# Patient Record
Sex: FEMALE | URBAN - METROPOLITAN AREA
[De-identification: names, ages, dates, MRNs, and addresses within clinical notes are randomized per-mention and may not be internally consistent; named-entity substitution may affect disease eponyms.]

---

## 2022-08-11 ENCOUNTER — APPOINTMENT (RX ONLY)
Dept: URBAN - METROPOLITAN AREA CLINIC 158 | Facility: CLINIC | Age: 57
Setting detail: DERMATOLOGY
End: 2022-08-11

## 2022-08-11 DIAGNOSIS — L65.0 TELOGEN EFFLUVIUM: ICD-10-CM

## 2022-08-11 PROCEDURE — ? COUNSELING

## 2022-08-11 PROCEDURE — 99202 OFFICE O/P NEW SF 15 MIN: CPT

## 2022-08-11 PROCEDURE — ? EDUCATIONAL RESOURCES PROVIDED

## 2022-08-11 PROCEDURE — ? PATIENT SPECIFIC COUNSELING

## 2022-08-11 ASSESSMENT — LOCATION DETAILED DESCRIPTION DERM: LOCATION DETAILED: LEFT SUPERIOR PARIETAL SCALP

## 2022-08-11 ASSESSMENT — LOCATION ZONE DERM: LOCATION ZONE: SCALP

## 2022-08-11 ASSESSMENT — LOCATION SIMPLE DESCRIPTION DERM: LOCATION SIMPLE: SCALP

## 2022-08-11 NOTE — PROCEDURE: PATIENT SPECIFIC COUNSELING
The patient had severe COVID infection with pneumonia but did not require hospitalization in Jan 2022. She began losing hair in April 2022. She notices that the hair loss seems to be slowing down over the past couple of weeks.  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  The condition is self-correcting and her hair will likely start to grow back over the next 3-6 months. Patient was recommended rogaine to hasten recovery.
Detail Level: Detailed